# Patient Record
(demographics unavailable — no encounter records)

---

## 2024-10-15 NOTE — HISTORY OF PRESENT ILLNESS
[FreeTextEntry1] :  26 year old male here for follow up for type 1 DM.  He has been in a good health since the last visit. States he was running out of insulin faster than his refills, so he was rationing at some point. Encouraged to call for more insulin refills in such situations, verbalizes understanding.   A1c Trend: A1c 7.5%, increased from 6.6%  He was originally diagnosed with T2DM in Dec 2021 and then was admitted to [facility] from 2/17/22-2/20/22 for DKA. Found with T1DM, with positive Zinc Transporter AB and МАРИНА ab. -complications: no cad, no cva, no neuropathy -Ophthalmologist: no retinopathy, 3/2022  Current meds: Lantus 20 Units once daily  Insulin Lispro: ISF: : add 0 units 151-200: add 1 units 201-250: add 2 units 251-300: add 3 units 301-350: add 4 units 351-400: add 5 units  Carb Ratios: Breakfast: 1:10 Lunch: 1:10 Dinner: 1:9  * offered insulin pump however his work requires him to go to small ducts and areas which might tear the pump off.  SMBG: Downloaded and interpreted as follows: Marked variation, Target 33%, high 39% high, very high 28%.   Diet: not much change B: oats or egg sandwich or sugar free cereal with milk or yogurt w granola, or whole wheat toast OR irving, or fruits (apple, pineapple) L: cold cut sandwich w ww bread and apple or fruit or small chips and milk or fried fish 1-2 protein bar, sliced apple or pineapples or yogurt during day as a snack D: grilled chicken or fish mashed potatoes, or vegetable soup or lentil soup Drinks: coffee, zero sugar iced tea, diet soda (occ), flavored water  Weight: stable  FHx: Father with type II DM, Maternal uncle with type II D, mother with hypothyriod

## 2024-10-15 NOTE — ASSESSMENT
[FreeTextEntry1] :  26 year old male here to establish care for type 1 DM.  Accompanied by his mother. Originally diagnosed with T2DM in Dec 2021 and then was admitted to [facility] from 2/17-2/20/22 for DKA. Found with T1DM, Zinc Transporter AB and GADA+.  # type 1 DM # hypoglycemic episodes A1c 6/10/2024: 7.5%, above goal for patient's age.  Goal less than 7%..  Distant uncle with type 1 DM. Family history of hypothyroid.   Plan: Consider Metformin adjunct at next visit. Can discuss further with Dr. Farley.  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   - enough refills sent and advised to call the office in the event of any insulin issue - c/w CGM - I discussed the importance of avoiding mild hypoglycemia (FS<70 mg/dl) and severe hypoglycemia (FS<55 mg/dl) with the patient. I also discussed hypoglycemia correction with 4 oz of juice or 4 glucose tablets and rechecking FS in 15 minutes. If FS is still low, repeat 4oz juice or 4 glucose tablets and consume 12 grams of carbohydrates. -  I had a lengthy discussion regarding healthy diet (consistent carbohydrates and low calorie content) with the patient. I also emphasized to maintain routine exercise activity (30 minutes daily or 150 minutes in the week). - Pt rotates injections sites, and takes Lispro about 10-15 minutes prior to eating. - advised to verify low bg on CGM with a glucometer - verbalizes understanding - d/w BG change with exercise - Optho: encouraged on annual dilated eye exam. - TFTs wnl. UTD with labs  # HLD LDL increased to 124 however this was at a time when the A1c also increased so encourage patient to continue diet and lifestyle modifications can focus on glycemic control for now.  # HTN Blood pressure has previously been slightly elevated, is not on any blood pressure medication. Urine albumin to creatinine ratio 8/12/2024 normal.  # overweight - counseled on diet and exercise  #Vitamin D deficiency: add on Vitamin D 800 IU daily.